# Patient Record
Sex: FEMALE | Race: OTHER | NOT HISPANIC OR LATINO | Employment: PART TIME | URBAN - METROPOLITAN AREA
[De-identification: names, ages, dates, MRNs, and addresses within clinical notes are randomized per-mention and may not be internally consistent; named-entity substitution may affect disease eponyms.]

---

## 2024-04-17 ENCOUNTER — OFFICE VISIT (OUTPATIENT)
Dept: DERMATOLOGY | Facility: CLINIC | Age: 59
End: 2024-04-17
Payer: COMMERCIAL

## 2024-04-17 DIAGNOSIS — L81.9 DYSCHROMIA: Primary | ICD-10-CM

## 2024-04-17 PROCEDURE — 99999 PR PBB SHADOW E&M-EST. PATIENT-LVL III: CPT | Mod: PBBFAC,,, | Performed by: PHYSICIAN ASSISTANT

## 2024-04-17 PROCEDURE — 1159F MED LIST DOCD IN RCRD: CPT | Mod: CPTII,S$GLB,, | Performed by: PHYSICIAN ASSISTANT

## 2024-04-17 PROCEDURE — 1160F RVW MEDS BY RX/DR IN RCRD: CPT | Mod: CPTII,S$GLB,, | Performed by: PHYSICIAN ASSISTANT

## 2024-04-17 PROCEDURE — 99204 OFFICE O/P NEW MOD 45 MIN: CPT | Mod: S$GLB,,, | Performed by: PHYSICIAN ASSISTANT

## 2024-04-17 NOTE — PROGRESS NOTES
Subjective:      Patient ID:  Shari Malcolm is a 58 y.o. female who presents for No chief complaint on file.    History of Present Illness: The patient presents with chief complaint of discoloration and skin tag .  Location: face and around mouth   Duration: 2 months   Signs/Symptoms: describes recent episode of tingling/ stinging of face after using a topical cosmetic cream.  This was followed by acute onset of dryness, flaking. Diffuse scattered darker patches and spots. Has since stopped all cosmetics except for Cera Ve lotion and Vaseline.  Denies new meds. Believes Neutrogena facial wash may have been the trigger for the burning sensation, no longer using. Using Villegas's skin success facial wash and Cera Ve lotion (thinks symptoms have calmed since paring back cosmetics). Denies hormone replacement.  Denies sun sensitivity.    Prior treatments: Cera Ve AM / PM therapy (stopped in past 2 weeks), Cera Ve moisturizing lotion     C/o skin tags of the neck.   skintag - several years)           Review of Systems   Constitutional:  Negative for fever and chills.   Gastrointestinal:  Negative for nausea and vomiting.   Skin:  Positive for dry skin and activity-related sunscreen use. Negative for itching, rash, daily sunscreen use, recent sunburn and dry lips.   Hematologic/Lymphatic: Does not bruise/bleed easily.       Objective:   Physical Exam   Constitutional: She appears well-developed and well-nourished. No distress.   Neurological: She is alert and oriented to person, place, and time. She is not disoriented.   Psychiatric: She has a normal mood and affect.   Skin:   Areas Examined (abnormalities noted in diagram):   Head / Face Inspection Performed  Neck Inspection Performed  Chest / Axilla Inspection Performed  Back Inspection Performed  RUE Inspected  LUE Inspection Performed            Diagram Legend     Erythematous scaling macule/papule c/w actinic keratosis       Vascular papule c/w angioma       Pigmented verrucoid papule/plaque c/w seborrheic keratosis      Yellow umbilicated papule c/w sebaceous hyperplasia      Irregularly shaped tan macule c/w lentigo     1-2 mm smooth white papules consistent with Milia      Movable subcutaneous cyst with punctum c/w epidermal inclusion cyst      Subcutaneous movable cyst c/w pilar cyst      Firm pink to brown papule c/w dermatofibroma      Pedunculated fleshy papule(s) c/w skin tag(s)      Evenly pigmented macule c/w junctional nevus     Mildly variegated pigmented, slightly irregular-bordered macule c/w mildly atypical nevus      Flesh colored to evenly pigmented papule c/w intradermal nevus       Pink pearly papule/plaque c/w basal cell carcinoma      Erythematous hyperkeratotic cursted plaque c/w SCC      Surgical scar with no sign of skin cancer recurrence      Open and closed comedones      Inflammatory papules and pustules      Verrucoid papule consistent consistent with wart     Erythematous eczematous patches and plaques     Dystrophic onycholytic nail with subungual debris c/w onychomycosis     Umbilicated papule    Erythematous-base heme-crusted tan verrucoid plaque consistent with inflamed seborrheic keratosis     Erythematous Silvery Scaling Plaque c/w Psoriasis     See annotation                Assessment / Plan:        Dyschromia  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 Can not r/o potential underlying ACD vs. ICD. Advised to d/c all fade or dark spot creams / topicals.  Encouraged strict sensitive skin care. Would avoid any HQ at this time.  Agree to trial of protopic ointment to help calm any potential irritation. Consider retinoid (tretinoin 0.025% cream qhs as tolerated) as long as no active symptoms of irritation. Encouraged daily spf 30 + w/broad spectrum coverage, sun protective clothing / hats/ glasses. Case discussed w/Dr. Pearson.            Follow up for dermatitis.

## 2024-04-24 ENCOUNTER — TELEPHONE (OUTPATIENT)
Dept: DERMATOLOGY | Facility: CLINIC | Age: 59
End: 2024-04-24
Payer: COMMERCIAL

## 2024-04-24 RX ORDER — TACROLIMUS 1 MG/G
OINTMENT TOPICAL
Qty: 30 G | Refills: 0 | Status: SHIPPED | OUTPATIENT
Start: 2024-04-24

## 2024-04-24 RX ORDER — TRETINOIN 0.25 MG/G
CREAM TOPICAL
Qty: 20 G | Refills: 3 | Status: SHIPPED | OUTPATIENT
Start: 2024-04-24 | End: 2024-05-31 | Stop reason: SDUPTHER

## 2024-04-24 NOTE — TELEPHONE ENCOUNTER
Called pt regarding medication. Pt informed message sent to provider. Patient informed she will be called regarding request. Pt verbalized understanding.     ----- Message from Diamone Speed sent at 4/24/2024  4:44 PM CDT -----  Regarding: self  Type:  Patient Returning Call         Who Called: self       Who Left Message for Patient: Alex Dooley       Does the patient know what this is regarding?: yes        Would the patient rather a call back or a response via [facility]? Clal back     Best Call Back Number:[phone]

## 2024-04-24 NOTE — TELEPHONE ENCOUNTER
Called pt regarding rx. No answer.[facility]    ----- Message from Mouna Wallace sent at 4/24/2024  3:54 PM CDT -----  Regarding: prescription  Pt said she came in the other day and she was supposed to get a prescription for a facial cream, but she didn't get it. Can yall call here about this? Pts phone number is [phone].     Thanks,  Mouna ALSTON

## 2024-04-25 ENCOUNTER — TELEPHONE (OUTPATIENT)
Dept: DERMATOLOGY | Facility: CLINIC | Age: 59
End: 2024-04-25
Payer: COMMERCIAL

## 2024-04-25 NOTE — TELEPHONE ENCOUNTER
Called pt regarding medication. Patient notified of new rx and skin care. Pt verbalized understanding.     ----- Message from Anna Handy LPN sent at 4/25/2024  4:57 PM CDT -----  Regarding: FW: prescription    ----- Message -----  From: Keyana Martinez PA-C  Sent: 4/24/2024   5:16 PM CDT  To: [facility] Staff  Subject: FW: prescription                                 Please apologize for the delay in her rx.  After further reviewing her pictures, I want to make sure that she is NOT using any over the counter fade creams or dark spot treatment creams, washes, etc. Please advise her to stop the Villegas's products as many of these have hydroquinone which is a fade cream that lightens the skin; however, after prolonged periods of application, it can cause a blue grey brown discoloration called exogenous ochronosis.      It is important to avoid any hydroquinone or fade creams.    For now, I will send an rx for a protopic ointment which she can use twice a day as needed for irritation (burning, itching, redness). Please use only Dove Sensitive Skin bar soap, Cetaphil Gentle Cleanser, or La Roche Posay Gentle cleanser.  I recommend daily spf 30+ with broad spectrum coverage.    When she no longer has any symptoms of irritation, then she may start the tretinoin 0.025% cream (retinoid that helps to exfoliate the skin and over time may help to fade the discoloration).      Please make sure she has f/u in 2-3 months.  ----- Message -----  From: Soumya March MA  Sent: 4/24/2024   4:30 PM CDT  To: Keyana Martinez PA-C  Subject: FW: prescription                                 Pt was supposed to be prescribed a facial cream, I looked into chart and did see anything, please advice. Thank you  ----- Message -----  From: Mouna Wallace  Sent: 4/24/2024   3:56 PM CDT  To: Juan Ridley Staff  Subject: prescription                                     Pt said she came in the other day and she was  supposed to get a prescription for a facial cream, but she didn't get it. Can yall call here about this? Pts phone number is [phone].     Thanks,  Mouna ALSTON

## 2024-05-31 DIAGNOSIS — L81.9 DYSCHROMIA: ICD-10-CM

## 2024-06-03 RX ORDER — TRETINOIN 0.25 MG/G
CREAM TOPICAL
Qty: 20 G | Refills: 3 | Status: SHIPPED | OUTPATIENT
Start: 2024-06-03 | End: 2025-05-31